# Patient Record
(demographics unavailable — no encounter records)

---

## 2024-11-08 NOTE — HISTORY OF PRESENT ILLNESS
[Right] : right hand dominant [FreeTextEntry1] : He comes in today for evaluation of right index finger injury which occurred 2 days ago after he cut the side of his index finger while cutting wood. He rates his pain as an 8/10. He has swelling but denies any radiating pain.  He was seen at the emergency room and a hemostatic dressing was applied.  He had x-rays which she was told there was no obvious fracture.  He is currently on Keflex.   He has a medical history of A-fib and is on Eliquis.   He is accompanied by his wife.

## 2024-11-08 NOTE — PHYSICAL EXAM
[de-identified] : - Constitutional: This is a male in no obvious distress. He is accompanied by his wife.  - Psych: Patient is alert and oriented to person, place and time.  Patient has a normal mood and affect. - Cardiovascular: Normal pulses throughout the upper extremities.  No significant varicosities are noted in the upper extremities.  ---  Examination of his right index finger after the Band-Aid was removed demonstrates the dressing along the radial aspect of the pulp.  It is hemostatic dressing and there is dried blood.  His flexion extensor tendons are intact.

## 2024-11-08 NOTE — ADDENDUM
[FreeTextEntry1] :  I, Daniel Turcios, acted solely as a scribe for Dr. Block on this date on 11/08/2024.

## 2024-11-08 NOTE — END OF VISIT
[FreeTextEntry3] : This note was written by Daniel Turcios on 11/08/2024 acting solely as a scribe for Dr. Leonardo Block.   All medical record entries made by the Scribe were at my, Dr. Leonardo Block, direction and personally dictated by me on 11/08/2024. I have personally reviewed the chart and agree that the record accurately reflects my personal performance of the history, physical exam, assessment and plan.

## 2024-11-08 NOTE — DISCUSSION/SUMMARY
[FreeTextEntry1] : He has findings consistent with a right index finger radial tip amputation along the pulp after an injury 2 days ago.   I had a discussion with the patient regarding today's visit, the prognosis of this diagnosis, and treatment recommendations and options. At this time, I recommended from him to keep his index finger bandaged and clean and dry. He should stop taking Keflex after Sunday. He will follow up in 1 week.    The patient has agreed to the above plan of management and has expressed full understanding. All questions were fully answered to the patient's satisfaction.   My cumulative time spent on this visit included: Preparation for the visit, review of the medical records, review of pertinent diagnostic studies, examination and counseling of the patient on the above diagnosis, treatment plan and prognosis, orders of diagnostic tests, medication and/or appropriate procedures and documentation in the medical records of today's visit.

## 2024-11-08 NOTE — REASON FOR VISIT
[Initial Visit] : an initial visit for [FreeTextEntry2] : right index finger [Follow-Up Visit] : a follow-up visit for

## 2024-11-15 NOTE — DISCUSSION/SUMMARY
[FreeTextEntry1] : I had a discussion regarding today's visit, the diagnosis and treatment recommendations and options.  We also discussed changes since the last visit.    At this time, I recommended  The patient has agreed to the above plan of management and has expressed full understanding.  All questions were fully answered to the patient's satisfaction.  My cumulative time spent on today's visit included: Preparation for the visit, review of the medical records, review of pertinent diagnostic studies, examination and counseling of the patient on the above diagnosis, treatment plan and prognosis, orders of diagnostic tests, medications and/or appropriate procedures and documentation in the medical records of today's visit.

## 2024-11-15 NOTE — HISTORY OF PRESENT ILLNESS
[FreeTextEntry1] : 3 weeks status post right index fingertip amputation.  See prior notes.  He is  He has a medical history of A-fib and is on Eliquis.   He is accompanied by his wife.

## 2024-11-15 NOTE — PHYSICAL EXAM
[de-identified] : - Constitutional: This is a male in no obvious distress. He is accompanied by his wife.  - Psych: Patient is alert and oriented to person, place and time.  Patient has a normal mood and affect. - Cardiovascular: Normal pulses throughout the upper extremities.  No significant varicosities are noted in the upper extremities.  ---  Examination of his right index finger demonstrates a hemostatic dressing and there is dried blood.  There are no signs of infection.  His flexion extensor tendons are intact.

## 2024-11-15 NOTE — DISCUSSION/SUMMARY
[FreeTextEntry1] : I had a discussion regarding today's visit, the diagnosis and treatment recommendations and options.  We also discussed changes since the last visit.    The dressing was soaked and trimmed.  It was not completely removed.  At this time, he was instructed on warm soaks and he will follow up in 12 days.  If there are any signs of infection or concerns before then, then he will return to the office earlier.  The patient has agreed to the above plan of management and has expressed full understanding.  All questions were fully answered to the patient's satisfaction.  My cumulative time spent on today's visit included: Preparation for the visit, review of the medical records, review of pertinent diagnostic studies, examination and counseling of the patient on the above diagnosis, treatment plan and prognosis, orders of diagnostic tests, medications and/or appropriate procedures and documentation in the medical records of today's visit.

## 2024-11-15 NOTE — PHYSICAL EXAM
[de-identified] : - Constitutional: This is a male in no obvious distress. He is accompanied by his wife.  - Psych: Patient is alert and oriented to person, place and time.  Patient has a normal mood and affect. - Cardiovascular: Normal pulses throughout the upper extremities.  No significant varicosities are noted in the upper extremities.  ---  Examination of his right index finger demonstrates a hemostatic dressing and there is dried blood.  There are no signs of infection.  His flexion extensor tendons are intact.

## 2024-11-15 NOTE — HISTORY OF PRESENT ILLNESS
[FreeTextEntry1] : 9 days status post right index fingertip amputation.  See note from when he was seen in the office 1 week ago.  He is having right index finger throbbing sensation.  He has a medical history of A-fib and is on Eliquis.   He is accompanied by his wife.

## 2024-11-15 NOTE — PHYSICAL EXAM
[de-identified] : - Constitutional: This is a male in no obvious distress. He is accompanied by his wife.  - Psych: Patient is alert and oriented to person, place and time.  Patient has a normal mood and affect. - Cardiovascular: Normal pulses throughout the upper extremities.  No significant varicosities are noted in the upper extremities.  ---  Examination of his right index finger demonstrates a hemostatic dressing and there is dried blood.  There are no signs of infection.  His flexion extensor tendons are intact.

## 2024-11-27 NOTE — DISCUSSION/SUMMARY
[FreeTextEntry1] : I had a discussion regarding today's visit, the diagnosis and treatment recommendations and options.  We also discussed changes since the last visit.    At this time, he was instructed on wound care and scar massage and desensitization. He will follow up in 4 weeks.  If there are any problems before then, then he will return to the office earlier.  The patient has agreed to the above plan of management and has expressed full understanding.  All questions were fully answered to the patient's satisfaction.  My cumulative time spent on today's visit included: Preparation for the visit, review of the medical records, review of pertinent diagnostic studies, examination and counseling of the patient on the above diagnosis, treatment plan and prognosis, orders of diagnostic tests, medications and/or appropriate procedures and documentation in the medical records of today's visit.

## 2024-11-27 NOTE — PHYSICAL EXAM
[de-identified] : - Constitutional: This is a male in no obvious distress. He is accompanied by his wife.  - Psych: Patient is alert and oriented to person, place and time.  Patient has a normal mood and affect. - Cardiovascular: Normal pulses throughout the upper extremities.  No significant varicosities are noted in the upper extremities.  ---  Examination of his right index finger demonstrates the majority of his hemostatic dressing to have fallen off.  There is no active bleeding.  There are no signs of infection.  His flexion extensor tendons are intact.

## 2024-11-27 NOTE — HISTORY OF PRESENT ILLNESS
[FreeTextEntry1] : 3 weeks status post right index fingertip amputation.  See prior notes.  He is overall doing well today.   He has a medical history of A-fib and is on Eliquis.   He is accompanied by his wife. 
[FreeTextEntry1] : 3 weeks status post right index fingertip amputation.  See prior notes.  He is overall doing well today.   He has a medical history of A-fib and is on Eliquis.   He is accompanied by his wife. 
16-Aug-2019 22:07

## 2024-11-27 NOTE — ADDENDUM
[FreeTextEntry1] :  I, Daniel Turcios, acted solely as a scribe for Dr. Block on this date on 11/27/2024.

## 2024-11-27 NOTE — PHYSICAL EXAM
[de-identified] : - Constitutional: This is a male in no obvious distress. He is accompanied by his wife.  - Psych: Patient is alert and oriented to person, place and time.  Patient has a normal mood and affect. - Cardiovascular: Normal pulses throughout the upper extremities.  No significant varicosities are noted in the upper extremities.  ---  Examination of his right index finger demonstrates the majority of his hemostatic dressing to have fallen off.  There is no active bleeding.  There are no signs of infection.  His flexion extensor tendons are intact.

## 2024-12-18 NOTE — PHYSICAL EXAM
[de-identified] : - Constitutional: This is a male in no obvious distress. He is accompanied by his wife.  - Psych: Patient is alert and oriented to person, place and time.  Patient has a normal mood and affect. - Cardiovascular: Normal pulses throughout the upper extremities.  No significant varicosities are noted in the upper extremities.  ---  Examination of his right index finger demonstrates the majority of his hemostatic dressing to have fallen off.  There is no active bleeding.  There are no signs of infection.  His flexion extensor tendons are intact.

## 2024-12-18 NOTE — HISTORY OF PRESENT ILLNESS
[FreeTextEntry1] : 8 weeks status post right index fingertip amputation.  This has been allowed to heal by secondary intention.  See prior notes.  He is  He has a medical history of A-fib and is on Eliquis.   He is accompanied by his wife.

## 2025-01-02 NOTE — PHYSICAL EXAM
[de-identified] : - Constitutional: This is a male in no obvious distress. He is accompanied by his wife.  - Psych: Patient is alert and oriented to person, place and time.  Patient has a normal mood and affect. - Cardiovascular: Normal pulses throughout the upper extremities.  No significant varicosities are noted in the upper extremities.  ---  Examination of his right index finger demonstrates his wound to be healed over.  He has decreased sensation to light touch along the radial aspect of the pulp.  There is some loss of contour radially where there was loss of tissue, but this has fully healed over.  He has full flexion and extension. [de-identified] : No new imaging performed today.

## 2025-01-02 NOTE — PHYSICAL EXAM
[de-identified] : - Constitutional: This is a male in no obvious distress. He is accompanied by his wife.  - Psych: Patient is alert and oriented to person, place and time.  Patient has a normal mood and affect. - Cardiovascular: Normal pulses throughout the upper extremities.  No significant varicosities are noted in the upper extremities.  ---  Examination of his right index finger demonstrates his wound to be healed over.  He has decreased sensation to light touch along the radial aspect of the pulp.  There is some loss of contour radially where there was loss of tissue, but this has fully healed over.  He has full flexion and extension. [de-identified] : No new imaging performed today.

## 2025-01-02 NOTE — DISCUSSION/SUMMARY
[FreeTextEntry1] : I had a discussion regarding today's visit, the diagnosis and treatment recommendations and options.  We also discussed changes since the last visit.  At this time, I recommended scar massage and desensitization.  I did tell him that hopefully with time, his sensation will improve.  He will follow-up on an as-needed basis.  The patient has agreed to the above plan of management and has expressed full understanding.  All questions were fully answered to the patient's satisfaction.  My cumulative time spent on today's visit included: Preparation for the visit, review of the medical records, review of pertinent diagnostic studies, examination and counseling of the patient on the above diagnosis, treatment plan and prognosis, orders of diagnostic tests, medications and/or appropriate procedures and documentation in the medical records of today's visit.

## 2025-01-02 NOTE — ADDENDUM
[FreeTextEntry1] : This note was written by Keegan Hawkins on 01/02/2025 acting solely as a scribe for Dr. Leonardo Block.   All medical record entries made by the Scribe were at my, Dr. Leonardo Block, direction and personally dictated by me on 01/02/2025. I have personally reviewed the chart and agree that the record accurately reflects my personal performance of the history, physical exam, assessment and plan.

## 2025-01-02 NOTE — HISTORY OF PRESENT ILLNESS
[FreeTextEntry1] : 8 weeks status post right index fingertip amputation.  This has been allowed to heal by secondary intention.  See prior notes.  He is doing well. However, he still notes numbness on his fingertip.   He has a medical history of A-fib and is on Eliquis.   He is accompanied by his wife.

## 2025-04-23 NOTE — ASSESSMENT
[FreeTextEntry1] : Renal US reviewed- No stones, hydro or solid masses. Right cyst.    right flank pain - no tenderness on exam I had long d/w pt about the nature of their pain being more likely musculoskeletal than renal in origin.  We discussed local care, such as gentle massage, heat therapy, pain relieving creams, Tylenol, and NSAID's (if able to take) as being likely to assist in management of pain.  plan 1) Ortho follow up  2) Follow up July 2025 as planned

## 2025-04-23 NOTE — PHYSICAL EXAM
[General Appearance - Well Developed] : well developed [] : no respiratory distress [Abdomen Soft] : soft [Oriented To Time, Place, And Person] : oriented to person, place, and time
